# Patient Record
Sex: MALE | Race: WHITE | Employment: UNEMPLOYED | URBAN - METROPOLITAN AREA
[De-identification: names, ages, dates, MRNs, and addresses within clinical notes are randomized per-mention and may not be internally consistent; named-entity substitution may affect disease eponyms.]

---

## 2018-02-03 PROBLEM — J69.0 ASPIRATION PNEUMONITIS (HCC): Status: ACTIVE | Noted: 2018-02-03

## 2018-02-04 PROBLEM — J69.0 ACUTE ASPIRATION PNEUMONIA (HCC): Status: ACTIVE | Noted: 2018-02-04

## 2018-02-28 NOTE — PROGRESS NOTES
Speech-Language Pathology  Discharge Summary     PATIENT NAME:  Alexander Gómez        :  1940        DISCHARGE DATE:  3/1/18  MEDICAL DIAGNOSIS: Squamous cell carcinoma of head and neck, history of laryngectomy, pharyngoesophageal dysphagia  REFERRING PHYSICIAN:  Dr. Pk Mancia     History of the Present Illness:  Alexander Gómez was referred by ENT, Dr. Pk Chang. Doc Chip in 2017 to [address] [place] [address] Speech Pathology Department for TEP evaluation and treatment due to a diagnosis of squamous cell carcinoma of head and neck, history of laryngectomy, and pharyngoesophageal dysphagia. Patient was unaccompanied to the session and provided medical history information. He reported that in 2013, he went to his dentist at the [facility] in [address]. At that time, the dentist found a mass in the patient's neck. He was referred to ENT, Dr. Ulises Freire at [facility]. The patient was found to have cancer and required a laryngectomy. Post laryngectomy, he communicated through writing. He did not have an electrolarynx. Approximately 2 months after his laryngectomy, the patient had a secondary puncture placed. He had been wearing a Orvel Soda Indwelling Voice Prosthesis since that time. He reported that the lifetime of each prosthesis has been approximately 4 months. He did not know the style or size of his prosthesis at the time of initial evaluation. The patient was also wearing a Orvel Soda Fenestrated Laryngectomy Tube at the time of initial evaluation. He reported that he required the tube to keep his tracheostoma open. It did not have an attachment for a heat and moisture exchanger. 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 However, the patient reported that he still felt like he was having difficulty swallowing.   The patient was eating a pureed diet with thin liquids at the time of initial evaluation .     Test Results and Observations: At the time of initial evaluation on 7/21/17, Mr. aSlly Roe presented with a  Carmina Scout Indwelling Voice Prosthesis. He was also wearing a Guzman Escobar Fenestrated Laryngectomy Tube. He did not have a strap on to hold the tube in place. He had a foam cover over his stoma. He finger occluded the laryngectomy tube to produce voicing. Voicing was excellent. The patient demonstrated fluent speech. However, the patient reported that he felt that his speech was more effortful since his recent dilation. His last voice prosthesis change was in June of 2017 at [facility]. He reported that his dilation was after his voice prosthesis change. Upon ingestion of thin liquid water, no leaking was observed through or around the prosthesis. The prosthesis was fitting well.      Mr. Sally Roe had attended a conference hosted by [facility].  At the conference, the patient was introduced to the [employer]. Mr. Sally Roe wanted to pursue this device in order to be free hands. However, the patient was not yet wearing an HME (Heat and Moisture Exchange System) and his laryngectomy tube would not be compatible with a Flexivoice.       Education was provided to the patient regarding Heat and Moisture Exchange Systems (HME). The patient had his stoma exposed (covered with a foam pad). The patient reported frequent coughing and mucous production. An HME system would heat and humidify the inhaled air. The patient would experience reduced mucus production, reduced coughing, improved pulmonary function, and improved hygiene. The HME could be discreetly hidden and help to reduce wet stains on clothing. Ibis Young was interested in using a HME system. As previously noted, he wanted to later transition to a Flexivoice. It was recommended that he wear an HME for 30 days prior to transitioning to a Flexivoice. He was in agreement.        On 8/17/17, while the primary SLP was on vacation, Mr. Sally Roe saw SLP, Janet Varner for a voice prosthesis refitting. The patient was refit with a  Carmina Butterfield Classic Indwelling 16 Namibian, 10 mm voice prosthesis. At the patient's next visit, voicing using finger occlusion was excellent. Patient reported no difficulty with phonation, no changes in his tissue, and no leaking through or around the prosthesis. On 8/25/17, the patient and SLP completed a Prescription and Diagnosis form to be sent to his referring physician for his approval.  The prescription included the following products:  XtraFlow HME                                                 60/month  Provox Larytube Fenestrated 9/36                  1/month  Provox LaryClip                                                                 1/month  Provox Lucero TubeHolder                                           1/month  Provox Tube Brush                                                            1 set    10/6/17: Patient reported intermittent leaking through and intermittent delayed phonation. SLP observed a sheen of water around the prosthesis. SLP offered to change the prosthesis. The patient was in agreement.       Previously, the SLP had discussed the option of changing to a Provox Rangel with XtraSeal.  This prosthesis was available for the patient and he was in agreement to try this prosthesis to see if it would eliminate the problems he was experiencing.       The following was completed:  · The voice prosthesis was removed with a hemostat. · An 25 Namibian dilator was inserted into the puncture. · After approximately 3 minutes, the dilator was removed. · SLP attempted to place the measuring stick through the puncture. However, the patient was grimacing in pain. SLP removed the measuring stick and the patient reported \"a little pain. \"  When asked on the pain scale 1-10, he reported 0 and indicating no pain. · SLP reinserted the dilator and waited another 3 minutes.     · The dilator was removed and the measuring stick was placed into the puncture. The patient again reported discomfort, but would not rate it on the pain scale. The patient continued to measure a 10 mm. · The measuring stick was removed and the dilator was re-inserted. · The Provox Rangel XtraSeal 17 Namibian 10mm was prepared for insertion. SLP explained the placement procedure. · SLP removed the dilator. The  stick was placed into the puncture. As the SLP depressed the stick, the patient pulled away from the  and the prosthesis was undershot. · SLP reloaded the prosthesis and attempted for the second time to insert the prosthesis. As the tip of the  was placed into the puncture, the patient again grimaced as if in pain. The stick was depressed and again under shot. SLP again questioned the patient regarding his pain and he reported that he was just hesitant and that it was uncomfortable. · SLP attempted to load the prosthesis into a gel cap, but due to the extra large esophageal flange, it could not be loaded into a gel cap. SLP reloaded the prosthesis for a third time into the  stick. The tip of the  stick was placed into the puncture and it was depressed. For the third attempt, the prosthesis was again under shot. The patient began to bleed from within the puncture. He was instructed to cough the blood into paper towels. After several minutes, the bleeding subsided and a dilator was reinserted into the puncture. · SLP recommended replacing the prosthesis with the previous model/brand: [employer] Classic Indwelling Voice Prosthesis 16 Namibian, 10 mm. Patient was in agreement. · The new prosthesis was loaded into a gel cap. · Dilator was removed and the new prosthesis was placed into the puncture. · After 3 minutes of swallowing, the prosthesis rotated 360 degrees in each direction. · The prosthesis strap was taped into place. · The flap was inspected with the handle of a cotton tipped swab.   It was moving appropriately. · The patient finger depressed the prosthesis and demonstrated excellent voicing. · The patient ingested 4 ounces of water with no leaking through or around the prosthesis. · The strap was cut and the prosthesis was rotated into place. · The patient placed his Larytube and HME. · Upon finger depression of the HME, voicing was excellent.     Salvador Hess was encouraged to contact the SLP if any leaking was observed around/through the prosthesis, if the patient had difficulty with phonation, or if any changes were noted in the tissue surrounding the prosthesis. Patient verbalized understanding.      10/20/17: During the session, the Flexivoice was trialed. The patient was fit with a medium strength which worked well for the patient. It was attached to his LaryTube. Two sizes of Kapigels were tried under the LaryTube to assist with creating a seal.  LaryClips were also used to hold the LaryTube in place. However, the patient could not maintain a seal with this system. Therefor, he was also sized for a LaryButton. A size 14/8 LaryButton was placed with the LaryClips in a horizontal position. The Flexivoice was attached and the patient was able to verbalize fluently hands free. However, the patient was unsure if he wanted to use both a LaryButton and LaryTube. He was also concerned with the cost of the products. 12/15/17: Patient reported on 12/15/17 that he had been seen at [facility] in [place] on 12/14/17 by ENT, Dr. Ismael Herring. He reported that he sought out Dr. Ismael Herring for an esophageal dilation. He reported this was completed on 12/14/17 while in the office with his voice prosthesis in place. He reported no change in his diet or voice since the dilation. He also reported that he was seen by an SLP at the [facility] in order to check for any leaking through or around the prosthesis. Patient reported there was no leaking.   He also reported that he was again evaluated and sized for a Larybutton. He requested to order a size 12/8 mm Larybutton. SLP explained that the Foye Bucco had been tried here in the past and the patient was not interested in it due to the fact that he could not wear it 24 hours per day and due to cost.  Mr. Eric Allison did not recall being fit for a LaryButton or discussing the cost.  Mr. Eric Allison had not reordered his current supplies due to cost.  He requested to revisit this option in the future once he is more consistent in wearing the currently prescribed products. Patient's outpatient therapy had been placed on hold as he was admitted into [facility] from 2/3/18-2/6/18. He was discharged on 2/6/18. An order to resume outpatient therapy was received by Dr. Rogelio aSucedo. 2/9/18: Patient had been approved by the [facility] for laryngectomy supplies. Patient brought his supplies with him from the [facility] for his laryngectomy care. He had a LaryTube, LaryClips, and HME's. Patient was also wearing a LaryTube with an XtraFlow HME in place. Voicing was excellent. He removed the LaryTube/HME and the SLP inspected his prosthesis. The prosthesis was clean. Tissue surrounding the prosthesis looked good. Upon ingesting of 2-3 ounces of water, no leaking through or around the prosthesis was noted. Patient was reminded to use a voice prosthesis plug if any leaking should occur through the prosthesis and then contact this SLP. Patient reported that he did not have a plug. This is something that has been discussed in the past.  The patient had not followed through in purchasing a plug. Information was provided again on the company to contact in order to order a voice prosthesis plug for his brand of prosthesis ([id number] Patient was also encouraged that if he ever visited the ER/hospital again, that he should bring his voice prosthesis products with him so that he could continue his cleaning routine without disruption.   He should also always have a supply of HME's available. Patient reported that since his hospital stay, he had not consistently worn the HME's. His thirty day challenge was restarted. It was also suggested that he begin a dated log of appointments, physicians, therapists, medications, contact numbers, and supplies. The patient has received services from [facility] in [place] and [address] [place] [address], and his referring ENT, Dr. Kristina Victor. He often gets confused on who he has seen what recommendations were made. 2/22/18: Mr. Porsche Jernigan was seen for TE therapy today. He was wearing his LaryTube, LaryClips, and HME. Voicing was excellent. The patient had called this SLP on 2/21/18 to report leaking intermittently through the prosthesis. During the visit, he was asked to remove his LaryTube and HME. SLP provided the patient with 5-6 ounces of water. A slight sheen of water was noted in the shaft of the prosthesis twice after drinking the water, but it moved back into the esophagus. No leaking into the trachea was noted. The voice prosthesis flap was inspected and moving appropriately. The prosthesis was in the correct position. The patient reported that the leaking was intermittent at home. He reported that he did use vegetable oil in the shaft of the prosthesis when the leaking was noted. The leaking stopped, but he could not recall if it was after he tried the vegetable oil. The patient did not follow through with the recommendation to order a voice prosthesis plug.      The patient was to have received a voice prosthesis replacement from the [place]. The patient did not bring that to the session with him. He did not know if he had received one from the [place]. SLP explained that the voice prosthesis could be changed during the visit. Patient was hesitant for two reasons:  1) at a previous site, he reported that the SLP used a \"numbing spray\" to change the prosthesis. That is not available here.   2) he did not want to pay for a [facility] if [facility]. Therefor, he requested that this SLP contact the [address] [place] [address] to see if he could go there for the voice prosthesis change and any additional therapy.       SLP contacted YARIEL Bae at the [facility]. The patient had previously signed a release for this SLP to communicate with Ms. Michael Velasquez regarding the patient's care and in order to help obtain supplies through the [address] [place] [address]. Ms. Michael Velasquez reported that the patient should have received a supply of replacement voice prosthesis. She was also willing to order both a voice prosthesis plug and shower cap. Ms. Michael Velasquez reported that the patient could be seen for voice prosthesis replacement at the [address] [place] [address], but that the patient would have to travel to [facility].       3/1/18: The patient requested to transfer to the [address] [place] [address] in [facility] for voice prosthesis refittings/management. He reported that he was scheduled for a refitting on 3/2/18 at 1300. He requested to be discharged from this facility at this time. Therefore, at the patient's request, he was discharged.        Diagnostic Impressions:  Admission:                                                                                                                                TEP voice prosthesis with finger occlusion      Discharge:  Waylon Powell [facility] Classic Indwelling Voice Prosthesis 16 Telugu, 10 mm. Larytube with XtraMoist/XtraFlow HME using finger depression. Voicing is excellent.     Recommendations/Treatment Plan:  Therapy was recommended one time per week for 30-60 minute sessions for an estimated 24 visits. Patient completed 16:24 recommended visits (This includes one visit with substitute SLP, Margarita Burdick).       Goals:  Progress in therapy has been recorded using the following key:  NC= no change; IMP= improved; ACH= achieved; NEI= not enough information    1.   Re-examine the patency of the current voice

## 2018-03-29 ENCOUNTER — HOSPITAL ENCOUNTER (OUTPATIENT)
Dept: SPEECH THERAPY | Age: 78
Setting detail: THERAPIES SERIES
Discharge: HOME | End: 2018-03-29
Payer: MEDICARE